# Patient Record
Sex: MALE | Race: WHITE | NOT HISPANIC OR LATINO | Employment: FULL TIME
[De-identification: names, ages, dates, MRNs, and addresses within clinical notes are randomized per-mention and may not be internally consistent; named-entity substitution may affect disease eponyms.]

---

## 2023-02-09 ASSESSMENT — ANXIETY QUESTIONNAIRES
2. NOT BEING ABLE TO STOP OR CONTROL WORRYING: SEVERAL DAYS
GAD7 TOTAL SCORE: 5
1. FEELING NERVOUS, ANXIOUS, OR ON EDGE: MORE THAN HALF THE DAYS
IF YOU CHECKED OFF ANY PROBLEMS ON THIS QUESTIONNAIRE, HOW DIFFICULT HAVE THESE PROBLEMS MADE IT FOR YOU TO DO YOUR WORK, TAKE CARE OF THINGS AT HOME, OR GET ALONG WITH OTHER PEOPLE: SOMEWHAT DIFFICULT
5. BEING SO RESTLESS THAT IT IS HARD TO SIT STILL: NOT AT ALL
6. BECOMING EASILY ANNOYED OR IRRITABLE: NOT AT ALL
3. WORRYING TOO MUCH ABOUT DIFFERENT THINGS: SEVERAL DAYS
GAD7 TOTAL SCORE: 5
7. FEELING AFRAID AS IF SOMETHING AWFUL MIGHT HAPPEN: NOT AT ALL
GAD7 TOTAL SCORE: 5
7. FEELING AFRAID AS IF SOMETHING AWFUL MIGHT HAPPEN: NOT AT ALL
4. TROUBLE RELAXING: SEVERAL DAYS
8. IF YOU CHECKED OFF ANY PROBLEMS, HOW DIFFICULT HAVE THESE MADE IT FOR YOU TO DO YOUR WORK, TAKE CARE OF THINGS AT HOME, OR GET ALONG WITH OTHER PEOPLE?: SOMEWHAT DIFFICULT

## 2023-02-12 ENCOUNTER — HEALTH MAINTENANCE LETTER (OUTPATIENT)
Age: 33
End: 2023-02-12

## 2023-02-16 ENCOUNTER — HOSPITAL ENCOUNTER (OUTPATIENT)
Dept: BEHAVIORAL HEALTH | Facility: CLINIC | Age: 33
Discharge: HOME | End: 2023-02-16
Attending: FAMILY MEDICINE | Admitting: FAMILY MEDICINE
Payer: COMMERCIAL

## 2023-02-16 DIAGNOSIS — F90.9 ATTENTION DEFICIT HYPERACTIVITY DISORDER (ADHD), UNSPECIFIED ADHD TYPE: Primary | ICD-10-CM

## 2023-02-16 PROCEDURE — 90791 PSYCH DIAGNOSTIC EVALUATION: CPT | Mod: GT,95 | Performed by: COUNSELOR

## 2023-02-16 ASSESSMENT — COLUMBIA-SUICIDE SEVERITY RATING SCALE - C-SSRS
TOTAL  NUMBER OF INTERRUPTED ATTEMPTS LIFETIME: NO
2. HAVE YOU ACTUALLY HAD ANY THOUGHTS OF KILLING YOURSELF?: NO
TOTAL  NUMBER OF ABORTED OR SELF INTERRUPTED ATTEMPTS LIFETIME: NO
ATTEMPT LIFETIME: NO
1. HAVE YOU WISHED YOU WERE DEAD OR WISHED YOU COULD GO TO SLEEP AND NOT WAKE UP?: NO
6. HAVE YOU EVER DONE ANYTHING, STARTED TO DO ANYTHING, OR PREPARED TO DO ANYTHING TO END YOUR LIFE?: NO

## 2023-02-16 ASSESSMENT — PATIENT HEALTH QUESTIONNAIRE - PHQ9: SUM OF ALL RESPONSES TO PHQ QUESTIONS 1-9: 6

## 2023-02-16 NOTE — PATIENT INSTRUCTIONS
Odell,  It was a pleasure meeting with you today. We got you scheduled for ADHD testing and a psychiatric appointment, see appointment details below. I also put a resources below. If you have any questions, any issues, or want more resources please reach out.    PSYCHIATRIC APPOINTMENT SCHEDULED  Date: Tuesday, 2/21/2023  Time: 8:00 am - 9:00 am  Provider: Dennys Garrison MS  CNP,RN  Location: [facility], [address] [place] [address], [address], [place] [address]  Phone: [phone]  Type: Telepsychiatry  Patient Instructions: Please fill New Patient Form by using following link. All forms need to be completed 24 hours prior to the appointment date/time by going to [email or web address]. Please call us at [phone] 24 hours prior to your scheduled appointment to confirm that you are able to attend. We will provide you information about how to log into video call software when you call.    ADHD TESTING APPOINTMENT  Date: Wednesday, 3/22/2023  Time: 11:00 am - 12:30 pm  Provider: Isaiah Madrigal MA, LP  Location[facility], [address] [place] [address]  Phone: [phone]  Type: In Person Testing    RESOURCE  [facility] (free short term therapy)  Website: [email or web address]    Yumiko Hobson LPCC, LADC  Licensed Psychotherapist  [facility]  [facility] [email or web address]  [email or web address]  Office: [phone]  Fax: [phone]  Gender pronouns: she/her/hers  Employed by: [employer]

## 2023-02-16 NOTE — PROGRESS NOTES
"    [facility]      PATIENT'S NAME: Odell Guerrero  PREFERRED NAME: Odell  PRONOUNS: he/him  MRN: [id number]   : 1990  ADDRESS: [address] [place] [address]  [facility]. NUMBER:  [id number]  DATE OF SERVICE: 23  START TIME: 8:03am  END TIME: 8:45am  PREFERRED PHONE: [phone]  May we leave a program related message: Yes  SERVICE MODALITY:  Video Visit:      Provider verified identity through the following two step process.  Patient provided:  Patient     Telemedicine Visit: The patient's condition can be safely assessed and treated via synchronous audio and visual telemedicine encounter.      Reason for Telemedicine Visit: Services only offered telehealth    Originating Site (Patient Location): Patient's home    Distant Site (Provider Location): Provider Remote Setting- Home Office    Consent:  The patient/guardian has verbally consented to: the potential risks and benefits of telemedicine (video visit) versus in person care; bill my insurance or make self-payment for services provided; and responsibility for payment of non-covered services.     Patient would like the video invitation sent by:  My Chart    Mode of Communication:  Video Conference via Klutch    Distant Location (Provider):  Off-site    As the provider I attest to compliance with applicable laws and regulations related to telemedicine.    UNIVERSAL ADULT Mental Health DIAGNOSTIC ASSESSMENT     Identifying Information:  Patient is a 32 year old,   individual.  Patient was referred for an assessment by self.  Patient attended the session alone.    Chief Complaint:   The reason for seeking services at this time is: \"Feeling spacy, making errors, missing details I should be capturing, lack of focus. I'm struggling with following through on tasks and attention to detail at work. I know what to do, how to do it, but I keep getting easily distracted, skip a step or two " "and make errors\".  The problem(s) began 10/03/22.    Patient has not attempted to resolve these concerns in the past.    Social/Family History:  Patient reported they grew up in other [place], [place].  They were raised by biological mother; biological father; stepfather.  Parents  /  when patient was age 6.  Patient reported that their childhood was relatively good. Did not have any large struggles or anything like that. Parents  when I was [age] or so. Past that, no abuse or financial issues. Patient is the oldest of his biological siblings and he has one step sister. Patient states, when parents  my brother and sister where only 6-8 months old, dad had us every other weekend and Wednesdays. Maintained that schedule through high school.  Patient described their current relationships with family of origin as good. Patient reports, all family is back where he grew up and surrounding areas of where he grew up.     The patient describes their cultural background as .  Cultural influences and impact on patient's life structure, values, norms, and healthcare: NA.  Contextual influences on patient's health include: none identified. Patient states what he is experiencing is causing stress in other areas but did not report any stressors contributing to his mental health.  These factors will be addressed in the Preliminary Treatment plan. Patient identified their preferred language to be English. Patient reported they does not need the assistance of an  or other support involved in therapy.     Patient reported had no significant delays in developmental tasks. Patient reports his step sister and brother went through testing as his brother has ADHD diagnosed and his step sister he believes struggles with dyslexia.   Patient's highest education level was graduate school.  Patient identified the following learning problems: attention and concentration.  Modifications will not be " used to assist communication in therapy.  Patient reports they are  able to understand written materials.    Patient reported the following relationship history as never legally , last time in a relationship was seven years ago.  Patient's current relationship status is single.   Patient identified their sexual orientation as heterosexual.  Patient reported having 0 child(mary). Patient identified mother; siblings; friends as part of their support system.  Patient identified the quality of these relationships as good.      Patient's current living/housing situation involves staying in own home/apartment and have two roommates who rent from him and they report that housing is stable.    Patient is currently employed fulltime. 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 I've been in this role/company for 8 months, but it this type if role for 4 years. 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 Patient reports their finances are obtained through employment. Patient does not identify finances as a current stressor.      Patient reported that they have not been involved with the legal system. Patient does not report being under probation/ parole/ jurisdiction.     Patient's Strengths and Limitations:  Patient identified the following strengths or resources that will help them succeed in treatment: friends / good social support, family support, motivation and work ethic. Things that may interfere with the patient's success in treatment include: none identified.     Personal and Family Medical History:  Patient does report a family history of mental health concerns.  Patient reports depression in father, anxiety in his step sister and ADHD in brother.    Patient does not report Mental Health Diagnosis or Treatment.       Patient has not had a physical exam to rule out medical causes for current symptoms.  Date of last physical exam was greater than a year ago and client was encouraged to schedule an exam with PCP. The patient does not have a Primary Care Provider and was encouraged to establish care with a PCP..  Patient reports no current medical concerns.  Patient denies any issues with pain.  There are not significant appetite / nutritional concerns / weight changes.   Patient does not report a history of head injury / trauma / cognitive impairment.      Patient reports current meds as:   No current outpatient medications on file.     No current facility-administered medications for this encounter.     Medication Adherence:  Patient reports not currently prescribed.    Patient Allergies:  Not on File    Medical History:  No past medical history on file.      Current Mental Status Exam:   Appearance:  Appropriate    Eye Contact:  Good   Psychomotor:  Normal       Gait / station:  Unable to assess, patient was sitting  Attitude / Demeanor: Cooperative  Friendly Pleasant  Speech      Rate / Production: Normal/ Responsive      Volume:  Normal  volume      Language:  intact  Mood:   Normal  Affect:   Appropriate    Thought Content: Clear   Thought Process: Coherent       Associations: No loosening of associations  Insight:   Good   Judgment:  Intact   Orientation:  All  Attention/concentration: Good      Substance Use:  Patient did not report a family history of substance use concerns; see medical history section for details.  Patient has not received chemical dependency treatment in the past.  Patient has not ever been to detox.      Patient is not currently receiving any chemical dependency treatment.       Substance History of use Age of first use Date of last use     Pattern and duration of use (include amounts and frequency)   Alcohol currently use   20 12/30/22 Per patient: 2-3 drinks per month.    Cannabis   never used      REPORTS SUBSTANCE USE: N/A     Amphetamines   never used     REPORTS SUBSTANCE USE: N/A   Cocaine/crack    never used       REPORTS SUBSTANCE USE: N/A   Hallucinogens never used         REPORTS SUBSTANCE USE: N/A   Inhalants never used         REPORTS SUBSTANCE USE: N/A   Heroin never used         REPORTS SUBSTANCE USE: N/A   Other Opiates never used     REPORTS SUBSTANCE USE: N/A   Benzodiazepine   never used     REPORTS SUBSTANCE USE: N/A   Barbiturates never used     REPORTS SUBSTANCE USE: N/A   Over the counter meds never used     REPORTS SUBSTANCE USE: N/A   Caffeine currently use 8  2/15/23 Per patient: average it out three times per week.    Nicotine  never used     REPORTS SUBSTANCE USE: N/A   Other substances not listed above:  Identify:  never used     REPORTS SUBSTANCE USE: N/A     Patient reported the following problems as a result of their substance use: no problems, not applicable.    Substance Use: No symptoms    Based on the negative CAGE score and clinical interview there  are not indications of drug or alcohol abuse.      Significant Losses / Trauma / Abuse / Neglect Issues:   Patient did not serve in the .  There are indications or report of significant loss, trauma, abuse or neglect issues related to: are no indications and client denies any losses, trauma, abuse, or neglect concerns.  Concerns for possible neglect are not present.     Assessments:  The following assessments were completed by patient for this visit:  PHQ9:   PHQ-9 SCORE 2/16/2023   PHQ-9 Total Score 6     GAD7:   SHAISTA-7 SCORE 2/9/2023   Total Score 5 (mild anxiety)   Total Score 5     CAGE-AID:   CAGE-AID Total Score 2/9/2023   Total Score 0   Total Score MyChart 0 (A total score of 2 or greater is considered clinically significant)     PROMIS 10-Global Health (all questions and answers displayed):   PROMIS 10 2/9/2023   In general, would you say your health is: Good   In general, would you say your quality of life is:  Good   In general, how would you rate your physical health? Good   In general, how would you rate your mental health, including your mood and your ability to think? Good   In general, how would you rate your satisfaction with your social activities and relationships? Fair   In general, please rate how well you carry out your usual social activities and roles Good   To what extent are you able to carry out your everyday physical activities such as walking, climbing stairs, carrying groceries, or moving a chair? Completely   How often have you been bothered by emotional problems such as feeling anxious, depressed or irritable? Often   How would you rate your fatigue on average? Moderate   How would you rate your pain on average?   0 = No Pain  to  10 = Worst Imaginable Pain 0   In general, would you say your health is: 3   In general, would you say your quality of life is: 3   In general, how would you rate your physical health? 3   In general, how would you rate your mental health, including your mood and your ability to think? 3   In general, how would you rate your satisfaction with your social activities and relationships? 2   In general, please rate how well you carry out your usual social activities and roles. (This includes activities at home, at work and in your community, and responsibilities as a parent, child, spouse, employee, friend, etc.) 3   To what extent are you able to carry out your everyday physical activities such as walking, climbing stairs, carrying groceries, or moving a chair? 5   In the past 7 days, how often have you been bothered by emotional problems such as feeling anxious, depressed, or irritable? 4   In the past 7 days, how would you rate your fatigue on average? 3   In the past 7 days, how would you rate your pain on average, where 0 means no pain, and 10 means worst imaginable pain? 0   Global Mental Health Score 10   Global Physical Health Score 16   PROMIS TOTAL - SUBSCORES 26      Bishop Suicide Severity Rating Scale (Lifetime/Recent)  Bishop Suicide Severity Rating (Lifetime/Recent) 2/16/2023   1. Wish to be Dead (Lifetime) 0   2. Non-Specific Active Suicidal Thoughts (Lifetime) 0   Actual Attempt (Lifetime) 0   Has subject engaged in non-suicidal self-injurious behavior? (Lifetime) 0   Interrupted Attempts (Lifetime) 0   Aborted or Self-Interrupted Attempt (Lifetime) 0   Preparatory Acts or Behavior (Lifetime) 0   Calculated C-SSRS Risk Score (Lifetime/Recent) No Risk Indicated   Per patient:no past or current SI or past attempts.     Safety Assessment:   Patient denies current homicidal ideation and behaviors.  Patient denies current self-injurious ideation and behaviors.    Patient denied risk behaviors associated with substance use.  Patient denies any high risk behaviors associated with mental health symptoms.  Patient reports the following current concerns for their personal safety: None.  Patient reports there are not firearms in the house.      History of Safety Concerns:  Patient denied a history of homicidal ideation.     Patient denied a history of personal safety concerns.    Patient denied a history of assaultive behaviors.    Patient denied a history of sexual assault behaviors.     Patient denied a history of risk behaviors associated with substance use.  Patient denies any history of high risk behaviors associated with mental health symptoms.  Patient reports the following protective factors: dedication to family or friends; safe and stable environment; abstinence from substances; effective problem solving skills; healthy fear of risky behaviors or pain; financial stability    Risk Plan:  See Recommendations for Safety and Risk Management Plan    Review of Symptoms per patient report:   Depression: No symptoms  Sandy:  No Symptoms  Psychosis: No Symptoms  Anxiety: Excessive worry and Nervousness, patient states, I started to lose trust in ability to do what I am  doing, gun shy in doing stuff that I know how I do and should be able to do just fine, because I messed up couple weeks ago on something. Patient states, I am constantly second guessing self, like do I really know what I am doing now since then.  Panic:  No symptoms  Post Traumatic Stress Disorder:  No Symptoms   Eating Disorder: No Symptoms  ADD / ADHD:  Inattentive, Difficulties listening, Poor task completion, Poor organizational skills, Distractibility and Forgetful,  Patient reports an example- couple weeks ago, at work there were 20 files that we needed to upload individually, felt like I uploaded them, all the files were named and really easy to file, one did not get uploaded some how and I am not sure how it did not. Patient states, on 2/3/23 had to do verification of pulling stuff down and noticed it was not uploaded like it should have, caused a few issues among the team. Always had some degree of lapses, but never gotten to being too disruptive at work. Occasionally there will be even e-mails where I miss the attachment. If I watch a movie I could watch it for two hours and difficult time recalling characters names. Attach in an e-mail yesterday where the attachment was referenced A and B and I said A and C in the e-mail and I am having gaps and missing things. Patient states, procrastination as well for as long as I can remember like the other things and do it a lot. Not good for self and work and still do it any ways. Patient states, I had several hours of work and did not start it until 8pm and worked all night to get it done. Knew in my head, had time and pushed it all the way to end. Patient reports he did that in college as well. Patient states, always gotten by with good grades so did not prompt conversation about it with a provider, but learned to cope with and manage through. Patient states, now that career progressed and responsibilities expanded it is not sustainable.   Conduct Disorder: No  symptoms  Autism Spectrum Disorder: No symptoms  Obsessive Compulsive Disorder: No Symptoms  Substance Use:  No symptoms     Patient reports the following compulsive behaviors and treatment history: none identified.      Diagnostic Criteria:   A) A persistent pattern of inattention and/or hyperactivity-impulsivity that interferes with functioning or development, as characterized by (1) Inattention and/or (2) Hyperactivity and Impulsivity  - Often fails to give close attention to details or makes careless mistakes in schoolwork, at work, or during other activities  - Often has difficulty sustaining attention in tasks or play activities  - Often has difficulty organizing tasks and activities  - Often avoids, dislikes, or is reluctant to engage in tasks that require sustained mental effort  - Is often easily distractedby extraneous stimuli  - Is often forgetful in daily activities      Functional Status:  Patient reports the following functional impairments:  health maintenance; money management; organization; self care; social interactions; work or vocational responsibilitiesDoes your mental or chemical health affect the following?. health maintenance; money management; organization; self care; social interactions; work or vocational responsibilities.   Nonprogrammatic care:  Patient is requesting basic services to address current mental health concerns.    Clinical Summary:  1. Reason for assessment: seeking recommendations.  2. Psychosocial, Cultural and Contextual Factors: none identified.  3. Principal DSM5 Diagnoses  (Sustained by DSM5 Criteria Listed Above):   Attention-Deficit/Hyperactivity Disorder  314.01 (F90.9) Unspecified Attention -Deficit / Hyperactivity Disorder.  4. Other Diagnoses that is relevant to services:    5. Provisional Diagnosis:    6. Prognosis: Expect Improvement and Relieve Acute Symptoms.  7. Likely consequences of symptoms if not treated: Without treatment patient more than likely will  experience a continuation of symptoms.  8. Client strengths include:  employed, motivated and work history .     Recommendations:     1. Plan for Safety and Risk Management:   Safety and Risk: Recommended that patient call 911 or go to the local ED should there be a change in any of these risk factors..          Report to child / adult protection services was NA.     2. Patient did not identify Yarsani, ethnic or cultural issues relevant to therapy at this time.Patient encouraged to ask for help should needs arise in the course of treatment.      3. Initial Treatment will focus on:    Attentional Problems     4. Resources/Service Plan:    services are not indicated.   Modifications to assist communication are not indicated.   Additional disability accommodations are not indicated.      5. Collaboration:   Collaboration / coordination of treatment will be initiated with the following  support professionals: none identified at this time.      6.  Referrals:   The following referral(s) will be initiated: Psychiatry  ADHD Testing.   Next Scheduled Appointment: PSYCHIATRIC APPOINTMENT SCHEDULED  Date: Tuesday, 2/21/2023  Time: 8:00 am - 9:00 am  Provider: Dennys Garrison MS, CNP,RN  Location: [facility], [address], [facility] [place] [place] [address]  Phone: [phone]  Type: Telepsychiatry  Patient Instructions: Please fill New Patient Form by using following link. All forms need to be completed 24 hours prior to the appointment date/time by going to [email or web address]. Please call us at [phone] 24 hours prior to your scheduled appointment to confirm that you are able to attend. We will provide you information about how to log into video call software when you call.     ADHD TESTING APPOINTMENT  Date: Wednesday, 3/22/2023  Time: 11:00 am - 12:30 pm  Provider: Isaiah Madrigal MA, LP  Location[facility], [address] [place] [address]  [place] [address]  Phone: [phone]  Type: In Person Testing.      A Release of Information has been obtained for the following: psychiatry.     Emergency Contact was not obtained. Patient declined.     Clinical Substantiation/medical necessity for the above recommendations: Patient would appear to benefit from working with a psychiatric provider to address his symptoms and get ADHD testing to confirm or rule out diagnosis. Patient reports he does not currently have any providers helping him address his mental health. Patient reports he is able to function in his daily task and responsibilities. Patient did not report any safety concerns at this time.    7. PARKER:    PARKER:  Recommendations:  No issues identified at this time.     8. Records:   These were reviewed at time of assessment.   Information in this assessment was obtained from the medical record and provided by patient who is a good historian.    Patient will have open access to their mental health medical record.    9.   Interactive Complexity: No      Provider Name/ Credentials:  Yumiko Silver, [facility], LADC  February 16, 2023

## 2024-03-10 ENCOUNTER — HEALTH MAINTENANCE LETTER (OUTPATIENT)
Age: 34
End: 2024-03-10

## 2025-03-16 ENCOUNTER — HEALTH MAINTENANCE LETTER (OUTPATIENT)
Age: 35
End: 2025-03-16